# Patient Record
Sex: FEMALE | Race: WHITE | NOT HISPANIC OR LATINO | Employment: UNEMPLOYED | URBAN - METROPOLITAN AREA
[De-identification: names, ages, dates, MRNs, and addresses within clinical notes are randomized per-mention and may not be internally consistent; named-entity substitution may affect disease eponyms.]

---

## 2021-03-02 ENCOUNTER — TELEPHONE (OUTPATIENT)
Dept: ORTHOPEDICS | Facility: CLINIC | Age: 31
End: 2021-03-02

## 2021-03-04 ENCOUNTER — TELEPHONE (OUTPATIENT)
Dept: ORTHOPEDICS | Facility: CLINIC | Age: 31
End: 2021-03-04

## 2021-03-10 ENCOUNTER — TELEPHONE (OUTPATIENT)
Dept: FAMILY MEDICINE | Facility: CLINIC | Age: 31
End: 2021-03-10

## 2021-03-17 ENCOUNTER — PATIENT OUTREACH (OUTPATIENT)
Dept: ADMINISTRATIVE | Facility: HOSPITAL | Age: 31
End: 2021-03-17

## 2021-06-11 ENCOUNTER — TELEPHONE (OUTPATIENT)
Dept: FAMILY MEDICINE | Facility: CLINIC | Age: 31
End: 2021-06-11

## 2021-07-01 ENCOUNTER — PATIENT MESSAGE (OUTPATIENT)
Dept: ADMINISTRATIVE | Facility: OTHER | Age: 31
End: 2021-07-01

## 2022-01-11 ENCOUNTER — TELEPHONE (OUTPATIENT)
Dept: PAIN MEDICINE | Facility: CLINIC | Age: 32
End: 2022-01-11
Payer: MEDICAID

## 2022-01-11 NOTE — TELEPHONE ENCOUNTER
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

## 2022-01-11 NOTE — TELEPHONE ENCOUNTER
Pt notified that she will need referral from PCP and we will add to waitlist. Pt verbalized understanding.

## 2022-05-24 ENCOUNTER — TELEPHONE (OUTPATIENT)
Dept: PAIN MEDICINE | Facility: CLINIC | Age: 32
End: 2022-05-24
Payer: MEDICAID

## 2022-05-24 NOTE — TELEPHONE ENCOUNTER
Informed pt that we are not taking anymore outside referrals for medicaid. Pt started to cry and got hostile. She said we were he last hope. I told her that I was very sorry and it must be very frustrating. Pt said she was wanting to commit suicide but did not have a plan. Police was called by Russell Spear. We were told they would go and check on her. I stayed on the phone with pt until she calmed down and solomon was called. Pt hung up the first time and I did have to call again

## 2022-05-24 NOTE — TELEPHONE ENCOUNTER
----- Message from Gracie Hernandez sent at 5/24/2022  3:28 PM CDT -----  Contact: ade  Patient is calling to speak with the nurse regarding referral that has been faxed. Reports that PCP, Dr. Abimbola Morton has referred patient to pain management and is requesting a high priority call back. Please give patient a call back at [phone] today as requested.  Thanks,  RP

## 2023-01-12 ENCOUNTER — TELEPHONE (OUTPATIENT)
Dept: PAIN MEDICINE | Facility: CLINIC | Age: 33
End: 2023-01-12
Payer: MEDICAID

## 2023-01-12 NOTE — TELEPHONE ENCOUNTER
----- Message from Janie Langford sent at 1/12/2023  2:03 PM CST -----  Contact: self  Type: Sooner Appointment Request        Caller is requesting a sooner appointment. Caller declined first available appointment listed below. Caller will not accept being placed on the waitlist and is requesting a message be sent to doctor.        Name of Caller: Patient   Best Call Back Number: [phone] (home)   Additional Information: Patient states she has severe back pain and wants her spine stimulator removed. Plz call pt today to verify that can be done by dr aguilera and pt  has medicaid as well. Thanks

## 2023-01-13 NOTE — TELEPHONE ENCOUNTER
Left a message informing pt that we are not accepting out of area insurance at this time private VM

## 2024-04-03 ENCOUNTER — TELEPHONE (OUTPATIENT)
Dept: PAIN MEDICINE | Facility: CLINIC | Age: 34
End: 2024-04-03
Payer: MEDICAID

## 2024-04-03 NOTE — TELEPHONE ENCOUNTER
----- Message from Paulette Rosenthal RN sent at 4/2/2024  4:42 PM CDT -----  Regarding: [facility] ER visit f/u 4/2/2024 Dx: Chronic low back pain  Please call patient to schedule a Medicaid appointment for further evaluation/treatment of her low back pain.     Thanks,    Paulette Rosenthal RN, BSN  ED Navigator/Case Management  [phone]      
Pt has been scheduled  
2 = assistive person

## 2024-04-18 ENCOUNTER — TELEPHONE (OUTPATIENT)
Dept: PAIN MEDICINE | Facility: CLINIC | Age: 34
End: 2024-04-18
Payer: MEDICAID

## 2024-04-18 NOTE — TELEPHONE ENCOUNTER
----- Message from Janie Langford sent at 4/17/2024  3:54 PM CDT -----  Contact: self  Type: Sooner Appointment Request        Caller is requesting a sooner appointment. Caller declined first available appointment listed below. Caller will not accept being placed on the waitlist and is requesting a message be sent to doctor.        Name of Caller: Patient   Best Call Back Number: [phone]  Additional Information: Pt states she needs to get r/s for her appt she had to cancel  the last one . Referral is in the chart. Thanks

## 2024-04-25 ENCOUNTER — TELEPHONE (OUTPATIENT)
Dept: PAIN MEDICINE | Facility: CLINIC | Age: 34
End: 2024-04-25
Payer: MEDICAID

## 2024-04-25 NOTE — TELEPHONE ENCOUNTER
----- Message from Sandhya Baldwin LPN sent at 4/25/2024  3:16 PM CDT -----  Regarding: FW: Return Call    ----- Message -----  From: Lily Padgett  Sent: 4/25/2024   2:29 PM CDT  To: Nasrin Chavez Staff  Subject: Return Call                                      Type:  Patient Returning Call    Who Called:Pt    Who Left Message for Patient:Kirsten    Does the patient know what this is regarding?:appt    Would the patient rather a call back or a response via [facility]? Call back    Best Call Back Number:[phone]      Additional Information: Please advise --     Have a great day. Thank you!

## 2024-04-25 NOTE — TELEPHONE ENCOUNTER
----- Message from Ifrah Valdez sent at 4/25/2024  4:11 PM CDT -----  Contact: self  Type:  Patient Returning Call    Who Called:  pt   Who Left Message for Patient:   jose francisco    Does the patient know what this is regarding?:  yes  Best Call Back Number:  [phone]   Additional Information:  please call

## 2024-04-29 NOTE — TELEPHONE ENCOUNTER
Next available Medicaid slot. Recommend she follow up with her PCP regarding pain in the interim. They can consider treatments like medications or PT.  If her pcp thinks she needs to be seen sooner, they can reach out.    She has had prior fusion surgery in the lower back. CT cervical, thoracic and lumbar spine show no acute fractures or issues.  There was mention of a fractured fusion screw on 2019 imaging reports and current imaging shows the same.

## 2024-04-30 ENCOUNTER — TELEPHONE (OUTPATIENT)
Dept: PAIN MEDICINE | Facility: CLINIC | Age: 34
End: 2024-04-30
Payer: MEDICAID

## 2024-04-30 NOTE — TELEPHONE ENCOUNTER
----- Message from Rolo Jeffrey sent at 4/30/2024  8:52 AM CDT -----  Regarding: sooner appt  Contact: ade at [phone]  Type:  Sooner Appointment Request    Caller is requesting a sooner appointment.  Caller declined first available appointment listed below.  Caller will not accept being placed on the waitlist and is requesting a message be sent to doctor.    Name of Caller:  Ade    When is the first available appointment?  None avail w/out override    Symptoms:  M54.50,G89.29 (ICD-10-CM) - Acute exacerbation of chronic low back pain    Would the patient rather a call back or a response via [facility]? call  Best Call Back Number:  [phone]    Additional Information:  there was appt at 10:30 today with [facility] but pt does not have transpo for today. Please call for next avail b/c epic did not show any appts through end of year.

## 2025-04-30 ENCOUNTER — TELEPHONE (OUTPATIENT)
Dept: PAIN MEDICINE | Facility: CLINIC | Age: 35
End: 2025-04-30
Payer: MEDICAID

## 2025-04-30 NOTE — TELEPHONE ENCOUNTER
----- Message from NIA Caldwell sent at 4/30/2025 10:15 AM CDT -----  Regarding: [facility] ER visit f/u 4/28/2025 Dx: Low back pain  Please call patient to schedule next available Medicaid appointment. Thanks,Paulette Rosenthal RN, BSNED Navigator/Case [phone]